# Patient Record
(demographics unavailable — no encounter records)

---

## 2024-10-29 NOTE — PHYSICAL EXAM
Advised on hospital visitor policy   [No Acute Distress] : no acute distress [Normal Sclera/Conjunctiva] : normal sclera/conjunctiva [Normal Outer Ear/Nose] : the outer ears and nose were normal in appearance [No JVD] : no jugular venous distention [No Respiratory Distress] : no respiratory distress  [No Accessory Muscle Use] : no accessory muscle use [Clear to Auscultation] : lungs were clear to auscultation bilaterally [Normal Rate] : normal rate  [Regular Rhythm] : with a regular rhythm [Normal S1, S2] : normal S1 and S2 [No Edema] : there was no peripheral edema [Soft] : abdomen soft [No CVA Tenderness] : no CVA  tenderness [No Joint Swelling] : no joint swelling

## 2024-10-30 NOTE — HEALTH RISK ASSESSMENT
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

## 2024-10-30 NOTE — HISTORY OF PRESENT ILLNESS
[FreeTextEntry1] : Establish care, annual visit [de-identified] : 26 year old female here to establish care. Currently she has no acute medical complaints.

## 2024-10-30 NOTE — HEALTH RISK ASSESSMENT
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

## 2024-10-30 NOTE — HISTORY OF PRESENT ILLNESS
[FreeTextEntry1] : Establish care, annual visit [de-identified] : 26 year old female here to establish care. Currently she has no acute medical complaints.

## 2024-10-30 NOTE — PLAN
[FreeTextEntry1] : Annual visit - Vitals/physical exam stable, will check labs, pt advised to f/u with GYN for pap smear